# Patient Record
Sex: FEMALE | URBAN - METROPOLITAN AREA
[De-identification: names, ages, dates, MRNs, and addresses within clinical notes are randomized per-mention and may not be internally consistent; named-entity substitution may affect disease eponyms.]

---

## 2018-10-15 NOTE — PATIENT DISCUSSION
REFRACTIVE ERROR, OU - DISCUSSED OPTION OF CORRECTING AT THE TIME OF CATARACT SURGERY. PT UNDERSTANDS AND ELECTS TO CONSIDER THEIR OPTIONS.

## 2021-01-08 ENCOUNTER — IMPORTED ENCOUNTER (OUTPATIENT)
Dept: URBAN - METROPOLITAN AREA CLINIC 1 | Facility: CLINIC | Age: 41
End: 2021-01-08

## 2021-01-08 PROBLEM — H10.45: Noted: 2021-01-08

## 2021-01-08 PROBLEM — S05.02XA: Noted: 2021-01-08

## 2021-01-08 PROCEDURE — 92002 INTRM OPH EXAM NEW PATIENT: CPT

## 2021-01-08 NOTE — PATIENT DISCUSSION
1.  Corneal Abrasion OS (Active)- Start Ofloxacin TID OS (erx'd) Start Refresh Tate 3 QID OU (sample given)2. KYAW w/ PEK OU- Recommend the frequent use of AT's BID OU3. Allergic Conjunctivitis OU- Recommend the frequent use of Zaditor PRN for itching 4. Vitreous Floaters OU- RD PrecautionsReturn for an appointment in 1wk 10 with Dr. Coreen Andersen.

## 2021-01-15 ENCOUNTER — IMPORTED ENCOUNTER (OUTPATIENT)
Dept: URBAN - METROPOLITAN AREA CLINIC 1 | Facility: CLINIC | Age: 41
End: 2021-01-15

## 2021-01-15 PROBLEM — S05.02XA: Noted: 2021-01-15

## 2021-01-15 PROCEDURE — 99213 OFFICE O/P EST LOW 20 MIN: CPT

## 2021-01-15 NOTE — PATIENT DISCUSSION
1. Peripheral Corneal Abrasion OS -- Resolved. D/c Ofloxacin TID OS. Cont ATs QID OS. 2.  KYAW w/ PEK OU -- Cont the use of ATs QID OU routinely. 3.  Allergic Conjunctivitis OU -- Begin Inveltys TID OU (Sample Given). Cont the use of Zaditor BID OU. 4.  H/o Vitreous Floaters OUReturn for an appointment in 1 month 10 with Dr. Adrian Rodriguez.

## 2022-04-02 ASSESSMENT — VISUAL ACUITY
OS_CC: 20/20-1
OD_CC: 20/25+1
OD_SC: J1+
OS_SC: J1+
OS_CC: 20/25-2
OS_SC: J1
OD_SC: J1
OD_CC: 20/20-1